# Patient Record
Sex: FEMALE | Race: WHITE | Employment: UNEMPLOYED | URBAN - NONMETROPOLITAN AREA
[De-identification: names, ages, dates, MRNs, and addresses within clinical notes are randomized per-mention and may not be internally consistent; named-entity substitution may affect disease eponyms.]

---

## 2019-11-23 ENCOUNTER — APPOINTMENT (OUTPATIENT)
Dept: CT IMAGING | Age: 49
End: 2019-11-23
Payer: COMMERCIAL

## 2019-11-23 ENCOUNTER — HOSPITAL ENCOUNTER (EMERGENCY)
Age: 49
Discharge: HOME | End: 2019-11-23
Attending: EMERGENCY MEDICINE
Payer: COMMERCIAL

## 2019-11-23 VITALS
WEIGHT: 139 LBS | HEART RATE: 68 BPM | HEIGHT: 64 IN | OXYGEN SATURATION: 99 % | RESPIRATION RATE: 16 BRPM | DIASTOLIC BLOOD PRESSURE: 79 MMHG | SYSTOLIC BLOOD PRESSURE: 119 MMHG | TEMPERATURE: 98.3 F | BODY MASS INDEX: 23.73 KG/M2

## 2019-11-23 DIAGNOSIS — K04.7 DENTAL ABSCESS: Primary | ICD-10-CM

## 2019-11-23 LAB
ANION GAP SERPL CALCULATED.3IONS-SCNC: 15 MMOL/L (ref 4–16)
BASOPHILS ABSOLUTE: 0 K/CU MM
BASOPHILS RELATIVE PERCENT: 0.3 % (ref 0–1)
BUN BLDV-MCNC: 9 MG/DL (ref 6–23)
CALCIUM SERPL-MCNC: 9.8 MG/DL (ref 8.3–10.6)
CHLORIDE BLD-SCNC: 99 MMOL/L (ref 99–110)
CO2: 27 MMOL/L (ref 21–32)
CREAT SERPL-MCNC: 0.7 MG/DL (ref 0.6–1.1)
DIFFERENTIAL TYPE: ABNORMAL
EOSINOPHILS ABSOLUTE: 0.3 K/CU MM
EOSINOPHILS RELATIVE PERCENT: 2.6 % (ref 0–3)
GFR AFRICAN AMERICAN: >60 ML/MIN/1.73M2
GFR NON-AFRICAN AMERICAN: >60 ML/MIN/1.73M2
GLUCOSE BLD-MCNC: 96 MG/DL (ref 70–99)
HCT VFR BLD CALC: 43.4 % (ref 37–47)
HEMOGLOBIN: 13.8 GM/DL (ref 12.5–16)
IMMATURE NEUTROPHIL %: 0.1 % (ref 0–0.43)
LACTATE: 1.3 MMOL/L (ref 0.4–2)
LYMPHOCYTES ABSOLUTE: 1.6 K/CU MM
LYMPHOCYTES RELATIVE PERCENT: 16.3 % (ref 24–44)
MCH RBC QN AUTO: 30.5 PG (ref 27–31)
MCHC RBC AUTO-ENTMCNC: 31.8 % (ref 32–36)
MCV RBC AUTO: 96 FL (ref 78–100)
MONOCYTES ABSOLUTE: 0.7 K/CU MM
MONOCYTES RELATIVE PERCENT: 7.8 % (ref 0–4)
PDW BLD-RTO: 13 % (ref 11.7–14.9)
PLATELET # BLD: 285 K/CU MM (ref 140–440)
PMV BLD AUTO: 10.2 FL (ref 7.5–11.1)
POTASSIUM SERPL-SCNC: 3.9 MMOL/L (ref 3.5–5.1)
RBC # BLD: 4.52 M/CU MM (ref 4.2–5.4)
SEGMENTED NEUTROPHILS ABSOLUTE COUNT: 6.9 K/CU MM
SEGMENTED NEUTROPHILS RELATIVE PERCENT: 72.9 % (ref 36–66)
SODIUM BLD-SCNC: 141 MMOL/L (ref 135–145)
TOTAL IMMATURE NEUTOROPHIL: 0.01 K/CU MM
WBC # BLD: 9.5 K/CU MM (ref 4–10.5)

## 2019-11-23 PROCEDURE — 99283 EMERGENCY DEPT VISIT LOW MDM: CPT

## 2019-11-23 PROCEDURE — 2580000003 HC RX 258: Performed by: EMERGENCY MEDICINE

## 2019-11-23 PROCEDURE — 80048 BASIC METABOLIC PNL TOTAL CA: CPT

## 2019-11-23 PROCEDURE — 70490 CT SOFT TISSUE NECK W/O DYE: CPT

## 2019-11-23 PROCEDURE — 96375 TX/PRO/DX INJ NEW DRUG ADDON: CPT

## 2019-11-23 PROCEDURE — 83605 ASSAY OF LACTIC ACID: CPT

## 2019-11-23 PROCEDURE — 6360000002 HC RX W HCPCS: Performed by: EMERGENCY MEDICINE

## 2019-11-23 PROCEDURE — 96366 THER/PROPH/DIAG IV INF ADDON: CPT

## 2019-11-23 PROCEDURE — 96365 THER/PROPH/DIAG IV INF INIT: CPT

## 2019-11-23 PROCEDURE — 85025 COMPLETE CBC W/AUTO DIFF WBC: CPT

## 2019-11-23 RX ORDER — CITALOPRAM 10 MG/1
10 TABLET ORAL DAILY
COMMUNITY
End: 2021-01-27

## 2019-11-23 RX ORDER — HYDROCODONE BITARTRATE AND ACETAMINOPHEN 5; 325 MG/1; MG/1
1 TABLET ORAL EVERY 8 HOURS PRN
Qty: 6 TABLET | Refills: 0 | Status: SHIPPED | OUTPATIENT
Start: 2019-11-23 | End: 2019-11-25

## 2019-11-23 RX ORDER — AMOXICILLIN AND CLAVULANATE POTASSIUM 250; 62.5 MG/5ML; MG/5ML
875 POWDER, FOR SUSPENSION ORAL 2 TIMES DAILY
Qty: 350 ML | Refills: 0 | Status: SHIPPED | OUTPATIENT
Start: 2019-11-23 | End: 2019-12-03

## 2019-11-23 RX ORDER — KETOROLAC TROMETHAMINE 30 MG/ML
15 INJECTION, SOLUTION INTRAMUSCULAR; INTRAVENOUS ONCE
Status: COMPLETED | OUTPATIENT
Start: 2019-11-23 | End: 2019-11-23

## 2019-11-23 RX ORDER — 0.9 % SODIUM CHLORIDE 0.9 %
1000 INTRAVENOUS SOLUTION INTRAVENOUS ONCE
Status: COMPLETED | OUTPATIENT
Start: 2019-11-23 | End: 2019-11-23

## 2019-11-23 RX ADMIN — SODIUM CHLORIDE 1000 ML: 9 INJECTION, SOLUTION INTRAVENOUS at 09:57

## 2019-11-23 RX ADMIN — SODIUM CHLORIDE 1.5 G: 9 INJECTION, SOLUTION INTRAVENOUS at 09:58

## 2019-11-23 RX ADMIN — KETOROLAC TROMETHAMINE 15 MG: 30 INJECTION, SOLUTION INTRAMUSCULAR at 09:30

## 2019-11-23 ASSESSMENT — PAIN SCALES - GENERAL
PAINLEVEL_OUTOF10: 10
PAINLEVEL_OUTOF10: 10
PAINLEVEL_OUTOF10: 3
PAINLEVEL_OUTOF10: 6

## 2019-11-23 ASSESSMENT — PAIN DESCRIPTION - DESCRIPTORS
DESCRIPTORS: THROBBING
DESCRIPTORS: THROBBING;ITCHING

## 2019-11-23 ASSESSMENT — PAIN DESCRIPTION - ONSET
ONSET: PROGRESSIVE
ONSET: SUDDEN

## 2019-11-23 ASSESSMENT — ENCOUNTER SYMPTOMS
VOMITING: 0
CONSTIPATION: 0
COUGH: 0
DIARRHEA: 0
SINUS PRESSURE: 0
SORE THROAT: 0
RHINORRHEA: 0
NAUSEA: 0
ABDOMINAL PAIN: 0
SHORTNESS OF BREATH: 0
WHEEZING: 0

## 2019-11-23 ASSESSMENT — PAIN - FUNCTIONAL ASSESSMENT
PAIN_FUNCTIONAL_ASSESSMENT: PREVENTS OR INTERFERES SOME ACTIVE ACTIVITIES AND ADLS
PAIN_FUNCTIONAL_ASSESSMENT: PREVENTS OR INTERFERES SOME ACTIVE ACTIVITIES AND ADLS

## 2019-11-23 ASSESSMENT — PAIN DESCRIPTION - PAIN TYPE
TYPE: ACUTE PAIN
TYPE: ACUTE PAIN

## 2019-11-23 ASSESSMENT — PAIN DESCRIPTION - ORIENTATION
ORIENTATION: LOWER;LEFT
ORIENTATION: LOWER;LEFT

## 2019-11-23 ASSESSMENT — PAIN DESCRIPTION - FREQUENCY
FREQUENCY: CONTINUOUS
FREQUENCY: CONTINUOUS

## 2019-11-23 ASSESSMENT — PAIN DESCRIPTION - PROGRESSION
CLINICAL_PROGRESSION: GRADUALLY IMPROVING
CLINICAL_PROGRESSION: GRADUALLY WORSENING

## 2019-11-23 ASSESSMENT — PAIN DESCRIPTION - LOCATION
LOCATION: MOUTH
LOCATION: MOUTH

## 2019-11-25 ENCOUNTER — HOSPITAL ENCOUNTER (EMERGENCY)
Age: 49
Discharge: HOME | End: 2019-11-25
Attending: EMERGENCY MEDICINE
Payer: COMMERCIAL

## 2019-11-25 VITALS
HEART RATE: 90 BPM | WEIGHT: 137 LBS | SYSTOLIC BLOOD PRESSURE: 129 MMHG | BODY MASS INDEX: 23.39 KG/M2 | HEIGHT: 64 IN | TEMPERATURE: 98.5 F | DIASTOLIC BLOOD PRESSURE: 79 MMHG | OXYGEN SATURATION: 100 % | RESPIRATION RATE: 16 BRPM

## 2019-11-25 DIAGNOSIS — K08.89 PAIN, DENTAL: Primary | ICD-10-CM

## 2019-11-25 PROCEDURE — 96372 THER/PROPH/DIAG INJ SC/IM: CPT

## 2019-11-25 PROCEDURE — 6370000000 HC RX 637 (ALT 250 FOR IP): Performed by: EMERGENCY MEDICINE

## 2019-11-25 PROCEDURE — 99283 EMERGENCY DEPT VISIT LOW MDM: CPT

## 2019-11-25 PROCEDURE — 6360000002 HC RX W HCPCS: Performed by: EMERGENCY MEDICINE

## 2019-11-25 RX ORDER — METOCLOPRAMIDE 10 MG/1
10 TABLET ORAL ONCE
Status: COMPLETED | OUTPATIENT
Start: 2019-11-25 | End: 2019-11-25

## 2019-11-25 RX ORDER — KETOROLAC TROMETHAMINE 30 MG/ML
15 INJECTION, SOLUTION INTRAMUSCULAR; INTRAVENOUS ONCE
Status: COMPLETED | OUTPATIENT
Start: 2019-11-25 | End: 2019-11-25

## 2019-11-25 RX ORDER — ACETAMINOPHEN 325 MG/1
650 TABLET ORAL ONCE
Status: DISCONTINUED | OUTPATIENT
Start: 2019-11-25 | End: 2019-11-25 | Stop reason: HOSPADM

## 2019-11-25 RX ORDER — METOCLOPRAMIDE 10 MG/1
10 TABLET ORAL 4 TIMES DAILY
Qty: 15 TABLET | Refills: 0 | Status: SHIPPED | OUTPATIENT
Start: 2019-11-25 | End: 2019-11-25

## 2019-11-25 RX ORDER — ONDANSETRON 4 MG/1
4 TABLET, ORALLY DISINTEGRATING ORAL EVERY 8 HOURS PRN
Qty: 15 TABLET | Refills: 0 | Status: SHIPPED | OUTPATIENT
Start: 2019-11-25 | End: 2019-11-30

## 2019-11-25 RX ADMIN — KETOROLAC TROMETHAMINE 15 MG: 30 INJECTION, SOLUTION INTRAMUSCULAR; INTRAVENOUS at 09:35

## 2019-11-25 RX ADMIN — METOCLOPRAMIDE HYDROCHLORIDE 10 MG: 10 TABLET ORAL at 09:35

## 2019-11-25 ASSESSMENT — PAIN DESCRIPTION - PROGRESSION: CLINICAL_PROGRESSION: NOT CHANGED

## 2019-11-25 ASSESSMENT — PAIN DESCRIPTION - PAIN TYPE: TYPE: ACUTE PAIN

## 2019-11-25 ASSESSMENT — PAIN DESCRIPTION - DESCRIPTORS: DESCRIPTORS: CONSTANT

## 2019-11-25 ASSESSMENT — PAIN SCALES - GENERAL
PAINLEVEL_OUTOF10: 8
PAINLEVEL_OUTOF10: 10
PAINLEVEL_OUTOF10: 10

## 2019-11-25 ASSESSMENT — PAIN DESCRIPTION - LOCATION: LOCATION: HEAD

## 2019-11-25 ASSESSMENT — PAIN DESCRIPTION - FREQUENCY: FREQUENCY: CONTINUOUS

## 2020-10-19 ENCOUNTER — TELEPHONE (OUTPATIENT)
Dept: CARDIOLOGY CLINIC | Age: 50
End: 2020-10-19

## 2020-10-21 ENCOUNTER — TELEPHONE (OUTPATIENT)
Dept: CARDIOLOGY CLINIC | Age: 50
End: 2020-10-21

## 2021-01-27 ENCOUNTER — INITIAL CONSULT (OUTPATIENT)
Dept: CARDIOLOGY CLINIC | Age: 51
End: 2021-01-27
Payer: COMMERCIAL

## 2021-01-27 ENCOUNTER — NURSE ONLY (OUTPATIENT)
Dept: CARDIOLOGY CLINIC | Age: 51
End: 2021-01-27

## 2021-01-27 VITALS
WEIGHT: 154.8 LBS | HEIGHT: 64 IN | SYSTOLIC BLOOD PRESSURE: 110 MMHG | DIASTOLIC BLOOD PRESSURE: 76 MMHG | HEART RATE: 94 BPM | BODY MASS INDEX: 26.43 KG/M2

## 2021-01-27 DIAGNOSIS — R07.89 OTHER CHEST PAIN: ICD-10-CM

## 2021-01-27 DIAGNOSIS — R00.2 HEART PALPITATIONS: Primary | ICD-10-CM

## 2021-01-27 DIAGNOSIS — R00.2 PALPITATIONS: Primary | ICD-10-CM

## 2021-01-27 PROCEDURE — 93000 ELECTROCARDIOGRAM COMPLETE: CPT | Performed by: INTERNAL MEDICINE

## 2021-01-27 PROCEDURE — 99243 OFF/OP CNSLTJ NEW/EST LOW 30: CPT | Performed by: INTERNAL MEDICINE

## 2021-01-27 RX ORDER — AMITRIPTYLINE HYDROCHLORIDE 10 MG/1
TABLET, FILM COATED ORAL NIGHTLY PRN
COMMUNITY
Start: 2021-01-16

## 2021-01-27 RX ORDER — PROMETHAZINE HYDROCHLORIDE 25 MG/1
TABLET ORAL
COMMUNITY

## 2021-01-27 RX ORDER — BUPROPION HYDROCHLORIDE 150 MG/1
150 TABLET ORAL EVERY MORNING
COMMUNITY

## 2021-01-27 NOTE — PROGRESS NOTES
 buPROPion (WELLBUTRIN XL) 150 MG extended release tablet Take 150 mg by mouth every morning      cyclobenzaprine (FLEXERIL) 10 MG tablet Take 10 mg by mouth 3 times daily as needed for Muscle spasms      rizatriptan (MAXALT) 10 MG tablet Take 10 mg by mouth as needed for Migraine May repeat in 2 hours if needed       No current facility-administered medications for this visit. Review of Systems:   · Constitutional: No Fever or Weight Loss   · Eyes: No Decreased Vision  · ENT: No Headaches, Hearing Loss or Vertigo  · Cardiovascular: No chest pain, dyspnea on exertion, palpitations or loss of consciousness  · Respiratory: No cough or wheezing    · Gastrointestinal: No abdominal pain, appetite loss, blood in stools, constipation, diarrhea or heartburn  · Genitourinary: No dysuria, trouble voiding, or hematuria  · Musculoskeletal:  No gait disturbance, weakness or joint complaints  · Integumentary: No rash or pruritis  · Neurological: No TIA or stroke symptoms  · Psychiatric: No anxiety or depression  · Endocrine: No malaise, fatigue or temperature intolerance  · Hematologic/Lymphatic: No bleeding problems, blood clots or swollen lymph nodes  · Allergic/Immunologic: No nasal congestion or hives  All systems negative except as marked. ·   ·      Physical Examination:    Vitals:    01/27/21 1023   BP: 110/76   Pulse: 94    rr 14  Afebrile    Wt Readings from Last 3 Encounters:   01/27/21 154 lb 12.8 oz (70.2 kg)   11/25/19 137 lb (62.1 kg)   11/23/19 139 lb (63 kg)     Body mass index is 26.57 kg/m². General Appearance:  No distress, conversant    Constitutional:  Well developed, Well nourished, No acute distress, Non-toxic appearance.    HENT:  Normocephalic, Atraumatic, Bilateral external ears normal, Oropharynx moist, No oral exudates, Nose normal. Neck- Normal range of motion, No tenderness, Supple, No stridor,no apical-carotid delay, no carotid bruit Eyes:  PERRL, EOMI, Conjunctiva normal, No discharge. Respiratory:  Normal breath sounds, No respiratory distress, No wheezing, No chest tenderness. ,no use of accessory muscles, diaphragm movement is normal  Cardiovascular: (PMI) apex non displaced,no lifts no thrills, no s3,no s4, Normal heart rate, Normal rhythm, No murmurs, No rubs, No gallops. Carotid arteries pulse and amplitude are normal no bruit, no abdominal bruit noted ( normal abdominal aorta ausculation), femoral arteries pulse and amplitude are normal no bruit, pedal pulses are normal  GI:  Bowel sounds normal, Soft, No tenderness, No masses, No pulsatile masses, no hepatosplenomegally, no bruits  : External genitalia appear normal, No masses or lesions. No discharge. No CVA tenderness. Musculoskeletal:  Intact distal pulses, No edema, No tenderness, No cyanosis, No clubbing. Good range of motion in all major joints. No tenderness to palpation or major deformities noted. Back- No tenderness. Integument:  Warm, Dry, No erythema, No rash. Skin: no rash, no ulcers  Lymphatic:  No lymphadenopathy noted. Neurologic:  Alert & oriented x 3, Normal motor function, Normal sensory function, No focal deficits noted. Psychiatric:  Affect normal, Judgment normal, Mood normal.   Lab Review   No results for input(s): WBC, HGB, HCT, PLT in the last 72 hours. No results for input(s): NA, K, CL, CO2, PHOS, BUN, CREATININE in the last 72 hours. Invalid input(s): CA  No results for input(s): AST, ALT, ALB, BILIDIR, BILITOT, ALKPHOS in the last 72 hours. No results for input(s): TROPONINI in the last 72 hours.   No results found for: BNP  No results found for: INR, PROTIME      EKG:nsr    Chest Xray:    ECHO:pending  Labs, echo, meds reviewed Assessment: 53 y. o.year old with PMH of  has a past medical history of Anxiety disorder GENERAL, Chronic migraine, Degenerative cervical disc, Depressive disorder GENERAL, Fibromyalgia muscle pain, Menopausal syndrome, Osteopenia, Osteoporosis, Scoliosis, and Spondylosis. Recommendations:    1. Palpitation: 30 days event monitor ordered, echo and stress test ordered  2. Chest pain:\" stress test and echo ordered  3. Depression and anxiety: on medications as above  4. PTSD\": on medications  5. Back pain: stable  6. Health maintenance: exerise and diet  All labs, medications and tests reviewed, continue all other medications of all above medical condition listed as is.          Dario Luciano MD, 1/27/2021 10:55 AM

## 2021-01-27 NOTE — LETTER
Dr. Luis Fernando Smith  1970  [id number]    Have you had any Chest Pain that is not new? - No  Have you had any Shortness of Breath - No    Have you had any dizziness - No    Have you had any palpitations that are not new?  - Yes  If Yes DO EKG - Do you feel your heart pounding & racing   How long does it last - .sometimes hours and sometimes all day    Do you have any edema - swelling in No      Do you have a surgery or procedure scheduled in the near future - No

## 2021-01-27 NOTE — PATIENT INSTRUCTIONS
30 days e-cardio monitor placed. SN # . Instructed patient on how to record the event and to call monitoring center at [phone] if any problems arise. Instructed patient to disconnect the lead wires from the electrodes before bathing or showering and reattach them afterwards. Instructed patient that the electrodes should be changed every 3 days or if they no longer adhere to the skin. Patient to mail package after the monitor has ended. Patient verbalized understanding.

## 2021-03-17 ENCOUNTER — TELEPHONE (OUTPATIENT)
Dept: CARDIOLOGY CLINIC | Age: 51
End: 2021-03-17

## 2021-03-17 NOTE — TELEPHONE ENCOUNTER
I called pt to inquire if she had stopped wearing her Event Monitor. Pt stated she was told by someone in our office to call [employer] and ask them to reset her monitor, as she was sick and out-of-town and for other reasons was unable to wear the monitor. Pt stated she would call [employer] today and let us know what the outcome is.

## 2021-03-29 ENCOUNTER — TELEPHONE (OUTPATIENT)
Dept: CARDIOLOGY CLINIC | Age: 51
End: 2021-03-29

## 2021-03-29 NOTE — TELEPHONE ENCOUNTER
Patient had called wanting to know if she can redo her Event Monitor. She wore it a couple days and went on vacation and forgot the monitor. I called [employer] and since they provided a report, they will have to send it to a approval committee. [employer] should let us know. I spoke to patient and she voiced understanding.

## 2021-06-29 ENCOUNTER — TELEPHONE (OUTPATIENT)
Dept: CARDIOLOGY CLINIC | Age: 51
End: 2021-06-29

## 2021-06-29 NOTE — TELEPHONE ENCOUNTER
Called [employer] once again to find out if pt's monitor was approved to be restarted. See 3/17/2021 phone encounter. Had to LM on  for [employer] to return call.

## 2021-07-18 ENCOUNTER — HOSPITAL ENCOUNTER (EMERGENCY)
Age: 51
Discharge: HOME | End: 2021-07-18
Attending: EMERGENCY MEDICINE
Payer: COMMERCIAL

## 2021-07-18 VITALS
SYSTOLIC BLOOD PRESSURE: 127 MMHG | OXYGEN SATURATION: 100 % | BODY MASS INDEX: 25.1 KG/M2 | TEMPERATURE: 98.2 F | DIASTOLIC BLOOD PRESSURE: 90 MMHG | RESPIRATION RATE: 16 BRPM | HEART RATE: 91 BPM | WEIGHT: 147 LBS | HEIGHT: 64 IN

## 2021-07-18 DIAGNOSIS — G43.909 NONINTRACTABLE CHRONIC MIGRAINE: Primary | ICD-10-CM

## 2021-07-18 PROCEDURE — 96372 THER/PROPH/DIAG INJ SC/IM: CPT

## 2021-07-18 PROCEDURE — 6360000002 HC RX W HCPCS: Performed by: EMERGENCY MEDICINE

## 2021-07-18 PROCEDURE — 99284 EMERGENCY DEPT VISIT MOD MDM: CPT

## 2021-07-18 RX ORDER — LORAZEPAM 2 MG/ML
2 INJECTION INTRAMUSCULAR ONCE
Status: COMPLETED | OUTPATIENT
Start: 2021-07-18 | End: 2021-07-18

## 2021-07-18 RX ORDER — HALOPERIDOL 5 MG/ML
5 INJECTION INTRAMUSCULAR ONCE
Status: COMPLETED | OUTPATIENT
Start: 2021-07-18 | End: 2021-07-18

## 2021-07-18 RX ADMIN — LORAZEPAM 2 MG: 2 INJECTION INTRAMUSCULAR; INTRAVENOUS at 20:30

## 2021-07-18 RX ADMIN — HALOPERIDOL LACTATE 5 MG: 5 INJECTION, SOLUTION INTRAMUSCULAR at 20:30

## 2021-07-18 ASSESSMENT — PAIN SCALES - GENERAL: PAINLEVEL_OUTOF10: 10

## 2021-07-18 ASSESSMENT — ENCOUNTER SYMPTOMS
NAUSEA: 1
EYES NEGATIVE: 1
RESPIRATORY NEGATIVE: 1

## 2021-07-18 ASSESSMENT — PAIN DESCRIPTION - PAIN TYPE: TYPE: ACUTE PAIN

## 2021-07-18 ASSESSMENT — PAIN DESCRIPTION - LOCATION: LOCATION: HEAD

## 2021-07-19 NOTE — ED NOTES
Pt presents to Ed with c/o migraine. Reports history of same. Takes maxalt at home but she is currently out of med.      Nelly Pardo RN  07/18/21 2019

## 2021-07-19 NOTE — ED NOTES
Discharge instructions reviewed and pt acknowledges understanding. Ambulatory at discharge.        Faustina Christopher RN  07/18/21 2036

## 2021-07-19 NOTE — ED PROVIDER NOTES
The history is provided by the patient. Headache  Pain location:  Occipital  Quality:  Dull  Radiates to:  L neck and R neck  Onset quality:  Gradual  Timing:  Constant  Progression:  Unchanged  Chronicity:  Chronic  Similar to prior headaches: yes    Context comment:  Patient has chronic neck pain and see pain management and this sometimes sets off her migraine. She also has history of migraine  Relieved by:  Nothing  Worsened by:  Sound  Ineffective treatments:  None tried (She normally takes maxalt but she hasnt refilled it and she cant refill it until tomorrow so she came to ER)  Associated symptoms: nausea        Review of Systems   Constitutional: Negative. HENT: Negative. Eyes: Negative. Respiratory: Negative. Cardiovascular: Negative. Gastrointestinal: Positive for nausea. Genitourinary: Negative. Musculoskeletal: Negative. Skin: Negative. Neurological: Positive for headaches. All other systems reviewed and are negative. History reviewed. No pertinent family history. Social History     Socioeconomic History    Marital status:      Spouse name: Not on file    Number of children: Not on file    Years of education: Not on file    Highest education level: Not on file   Occupational History    Not on file   Tobacco Use    Smoking status: Never Smoker    Smokeless tobacco: Never Used   Vaping Use    Vaping Use: Never used   Substance and Sexual Activity    Alcohol use: No    Drug use: No    Sexual activity: Yes     Partners: Male   Other Topics Concern    Not on file   Social History Narrative    Not on file     Social Determinants of Health     Financial Resource Strain:     Difficulty of Paying Living Expenses:    Food Insecurity:     Worried About Running Out of Food in the Last Year:     [address] in the Last Year:    Transportation Needs:     Lack of Transportation (Medical):      Lack of Transportation (Non-Medical):    Physical Activity:     Days of Exercise per Week:     Minutes of Exercise per Session:    Stress:     Feeling of Stress :    Social Connections:     Frequency of Communication with Friends and Family:     Frequency of Social Gatherings with Friends and Family:     Attends Spiritism Services:     Active Member of Clubs or Organizations:     Attends Club or Organization Meetings:     Marital Status:    Intimate Partner Violence:     Fear of Current or Ex-Partner:     Emotionally Abused:     Physically Abused:     Sexually Abused:      Past Surgical History:   Procedure Laterality Date    HYSTERECTOMY       Past Medical History:   Diagnosis Date    Anxiety disorder GENERAL     Chronic migraine     Degenerative cervical disc     Depressive disorder GENERAL     Fibromyalgia muscle pain     Menopausal syndrome     Osteopenia     Osteoporosis     Scoliosis     Spondylosis      Allergies   Allergen Reactions    Contrast [Iodides] Shortness Of Breath    Tetracyclines & Related     Tramadol      Doesn't like the way it makes her feel. Prior to Admission medications    Medication Sig Start Date End Date Taking? Authorizing Provider   amitriptyline (ELAVIL) 10 MG tablet TAKE ONE TABLET BY MOUTH DAILY 1/16/21  Yes Historical Provider, MD   promethazine (PHENERGAN) 25 MG tablet promethazine 25 mg tablet   Take 1 tablet every 4 hours by oral route.    Yes Historical Provider, MD   buPROPion (WELLBUTRIN XL) 150 MG extended release tablet Take 150 mg by mouth every morning   Yes Historical Provider, MD   cyclobenzaprine (FLEXERIL) 10 MG tablet Take 10 mg by mouth 3 times daily as needed for Muscle spasms   Yes Historical Provider, MD   rizatriptan (MAXALT) 10 MG tablet Take 10 mg by mouth as needed for Migraine May repeat in 2 hours if needed   Yes Historical Provider, MD       BP (!) 127/90   Pulse 91   Temp 98.2 °F (36.8 °C) (Oral)   Resp 16   Ht 5' 4\" (1.626 m)   Wt 147 lb (66.7 kg)   SpO2 100%   BMI 25.23 kg/m² Physical Exam  Vitals and nursing note reviewed. Exam conducted with a chaperone present. Constitutional:       Appearance: She is well-developed. HENT:      Head: Normocephalic and atraumatic. Right Ear: External ear normal. No hemotympanum. Tympanic membrane is not erythematous. Left Ear: External ear normal. No hemotympanum. Tympanic membrane is not erythematous. Nose: Nose normal.   Eyes:      Conjunctiva/sclera: Conjunctivae normal.      Pupils: Pupils are equal, round, and reactive to light. Neck:      Trachea: Trachea normal.      Meningeal: Brudzinski's sign and Kernig's sign absent. Cardiovascular:      Rate and Rhythm: Normal rate and regular rhythm. Heart sounds: Normal heart sounds. Pulmonary:      Effort: Pulmonary effort is normal.      Breath sounds: Normal breath sounds. Abdominal:      General: Bowel sounds are normal.      Palpations: Abdomen is soft. Musculoskeletal:         General: Normal range of motion. Cervical back: Normal range of motion and neck supple. No rigidity. No spinous process tenderness or muscular tenderness. Normal range of motion. Skin:     General: Skin is warm and dry. Neurological:      Mental Status: She is alert and oriented to person, place, and time. GCS: GCS eye subscore is 4. GCS verbal subscore is 5. GCS motor subscore is 6. Cranial Nerves: No cranial nerve deficit. Sensory: No sensory deficit. Motor: No tremor, atrophy or abnormal muscle tone. Coordination: Coordination normal.      Gait: Gait normal.      Deep Tendon Reflexes: Reflexes are normal and symmetric. Reflex Scores:       Tricep reflexes are 2+ on the right side and 2+ on the left side. Bicep reflexes are 2+ on the right side and 2+ on the left side. Brachioradialis reflexes are 2+ on the right side and 2+ on the left side. Patellar reflexes are 2+ on the right side and 2+ on the left side.        Achilles reflexes are 2+ on the right side and 2+ on the left side. Psychiatric:         Behavior: Behavior normal.         Thought Content: Thought content normal.         Judgment: Judgment normal.         MDM:    Labs Reviewed - No data to display    No orders to display        HAldol and ativan to help her \"relax\" advised to fill her maxalt in am and use as directed  My typical dicussion, presentation,and considerations for this patients' chief complaint, diagnosis, and differential diagnosis have been considered and discussed. I have stressed need for follow up and reexamination for this encounter. The patient  was informed to discuss with her PCM and her chronic pain doctor about medications in order to avoid ER if possible. Sometimes these medications need to be changed The patient  was also told to return to the emergency department if any changes or any concern. Patient  questions and concerns from this visit have been addressed prior to discharge. Patient was not prescribed medication. The medication(s) use,  medication(s) safety and medication(s) interactions with already prescribed medication(s) have been explained and outlined for this encounter. The patient  was educated that it is their responsibility to verify this information is correct at the time of discharge and to contact this department of any complications with the pharmacy providing this medication(s) or if their any difficulty in obtaining this medication(s). Final Impression    1.  Nonintractable chronic migraine              [address] Emmanuel DO  07/18/21 4449

## 2021-07-23 ENCOUNTER — HOSPITAL ENCOUNTER (EMERGENCY)
Age: 51
Discharge: HOME | End: 2021-07-23
Attending: EMERGENCY MEDICINE
Payer: COMMERCIAL

## 2021-07-23 VITALS
DIASTOLIC BLOOD PRESSURE: 88 MMHG | BODY MASS INDEX: 25.27 KG/M2 | OXYGEN SATURATION: 100 % | SYSTOLIC BLOOD PRESSURE: 145 MMHG | WEIGHT: 148 LBS | RESPIRATION RATE: 16 BRPM | TEMPERATURE: 97.9 F | HEIGHT: 64 IN | HEART RATE: 80 BPM

## 2021-07-23 DIAGNOSIS — M54.2 CHRONIC NECK PAIN: ICD-10-CM

## 2021-07-23 DIAGNOSIS — G89.29 CHRONIC NECK PAIN: ICD-10-CM

## 2021-07-23 DIAGNOSIS — H81.12 BENIGN PAROXYSMAL POSITIONAL VERTIGO OF LEFT EAR: Primary | ICD-10-CM

## 2021-07-23 PROCEDURE — 99285 EMERGENCY DEPT VISIT HI MDM: CPT

## 2021-07-23 PROCEDURE — 6370000000 HC RX 637 (ALT 250 FOR IP): Performed by: EMERGENCY MEDICINE

## 2021-07-23 RX ORDER — HYDROCODONE BITARTRATE AND ACETAMINOPHEN 5; 325 MG/1; MG/1
1 TABLET ORAL EVERY 6 HOURS PRN
COMMUNITY

## 2021-07-23 RX ORDER — MECLIZINE HYDROCHLORIDE 25 MG/1
25 TABLET ORAL ONCE
Status: COMPLETED | OUTPATIENT
Start: 2021-07-23 | End: 2021-07-23

## 2021-07-23 RX ORDER — ACETAMINOPHEN 500 MG
1000 TABLET ORAL ONCE
Status: DISCONTINUED | OUTPATIENT
Start: 2021-07-23 | End: 2021-07-23 | Stop reason: HOSPADM

## 2021-07-23 RX ORDER — MECLIZINE HYDROCHLORIDE 25 MG/1
25 TABLET ORAL 3 TIMES DAILY PRN
Qty: 10 TABLET | Refills: 0 | Status: SHIPPED | OUTPATIENT
Start: 2021-07-23 | End: 2021-08-02

## 2021-07-23 RX ADMIN — MECLIZINE HYDROCHLORIDE 25 MG: 25 TABLET ORAL at 04:45

## 2021-07-23 ASSESSMENT — PAIN DESCRIPTION - PROGRESSION: CLINICAL_PROGRESSION: GRADUALLY WORSENING

## 2021-07-23 ASSESSMENT — PAIN DESCRIPTION - FREQUENCY: FREQUENCY: CONTINUOUS

## 2021-07-23 ASSESSMENT — PAIN DESCRIPTION - LOCATION: LOCATION: BACK;NECK

## 2021-07-23 ASSESSMENT — PAIN SCALES - GENERAL
PAINLEVEL_OUTOF10: 4
PAINLEVEL_OUTOF10: 4

## 2021-07-23 ASSESSMENT — PAIN DESCRIPTION - ONSET: ONSET: SUDDEN

## 2021-07-23 ASSESSMENT — PAIN DESCRIPTION - DESCRIPTORS: DESCRIPTORS: ACHING;STABBING

## 2021-07-23 ASSESSMENT — PAIN DESCRIPTION - PAIN TYPE: TYPE: ACUTE PAIN

## 2021-07-23 ASSESSMENT — PAIN DESCRIPTION - ORIENTATION: ORIENTATION: UPPER

## 2021-07-23 NOTE — ED NOTES
Has had upper back neck pain and got a massage lastnight and the pain was relieved at that time. Awakened at 0200 with dizziness.      Gt Ovalle RN  07/23/21 0691

## 2021-07-23 NOTE — ED PROVIDER NOTES
Emergency Department Encounter    Patient: Simon Shoemaker  MRN: [id number]  : 1970  Date of Evaluation: 2021  ED Provider:  Lion Amor MD    Triage Chief Complaint:   Dizziness    [place]:  Simon Shoemaker is a 49 y.o. female with past medical history of anxiety, fibromyalgia, depression, migraines, chronic neck pain that presents via EMS with intermittent vertigo. Patient with history of benign paroxysmal positional vertigo. Patient reports that she was laying on her left went to roll on her right had acute episode of the room spinning. Patient ports symptoms only lasted a few seconds. Patient also reported sensation of feeling lightheaded like she is in the passout which also only lasted a few seconds. Patient reports symptoms  have resolved however she still feels slightly nauseous. Patient reports chronic neck pain; no recent change. Patient is unaware of any alleviating or aggravating factors. No numbness or tingling. No speech difficulties. No facial droop. No headache. No head trauma. No tinnitus. Does feel some bilateral ear pressure.     ROS - see HPI, below listed is current ROS at time of my eval:  General:  No fevers, no chills, no weakness  Eyes:  No recent vison changes, no discharge  ENT:  No sore throat, no nasal congestion, no hearing changes  Cardiovascular:  No chest pain, no palpitations  Respiratory:  No shortness of breath, no cough, no wheezing  Gastrointestinal:  No pain, no nausea, no vomiting, no diarrhea  Musculoskeletal:  No muscle pain, no joint pain  Skin:  No rash, no pruritis, no easy bruising  Neurologic:  No speech problems, no headache, no extremity numbness, no extremity tingling, no extremity weakness  Psychiatric:  No anxiety  Genitourinary:  No dysuria, no hematuria  Endocrine:  No unexpected weight gain, no unexpected weight loss  Extremities:  no edema, no pain    Past Medical History:   Diagnosis Date    Anxiety disorder GENERAL     Chronic migraine MD         Current Outpatient Medications   Medication Sig Dispense Refill    HYDROcodone-acetaminophen (NORCO) 5-325 MG per tablet Take 1 tablet by mouth every 6 hours as needed for Pain. Only takes 1/2 tablet      meclizine (ANTIVERT) 25 MG tablet Take 1 tablet by mouth 3 times daily as needed for Dizziness or Nausea 10 tablet 0    amitriptyline (ELAVIL) 10 MG tablet TAKE ONE TABLET BY MOUTH DAILY      promethazine (PHENERGAN) 25 MG tablet promethazine 25 mg tablet   Take 1 tablet every 4 hours by oral route.  buPROPion (WELLBUTRIN XL) 150 MG extended release tablet Take 150 mg by mouth every morning      rizatriptan (MAXALT) 10 MG tablet Take 10 mg by mouth as needed for Migraine May repeat in 2 hours if needed      cyclobenzaprine (FLEXERIL) 10 MG tablet Take 10 mg by mouth 3 times daily as needed for Muscle spasms       Allergies   Allergen Reactions    Contrast [Iodides] Shortness Of Breath    Tetracyclines & Related     Tramadol      Doesn't like the way it makes her feel. Nursing Notes Reviewed    Physical Exam:  Triage VS:    ED Triage Vitals   Enc Vitals Group      BP       Pulse       Resp       Temp       Temp src       SpO2       Weight       Height       Head Circumference       Peak Flow       Pain Score       Pain Loc       Pain Edu? Excl. in [address]? My pulse ox interpretation is - normal    General appearance: Patient appears very anxious. No acute distress. Skin:  Warm. Dry. Eye:  Extraocular movements intact. Ears, nose, mouth and throat:  Oral mucosa moist   Neck:  Trachea midline. Extremity:  No swelling. Normal ROM     Heart:  Regular rate and rhythm, normal S1 & S2, no extra heart sounds. Perfusion:  intact  Respiratory:  Lungs clear to auscultation bilaterally. Respirations nonlabored. Abdominal:  Normal bowel sounds. Soft. Nontender. Non distended. Back:  No CVA tenderness to palpation     Neurological:  Alert and oriented times 3.   No focal neuro deficits. Psychiatric: Anxious  Mental Status Exam:   Alert and oriented times three, follows commands, speech and language intact, gait normal    Cranial Mciryr-RO-GJC Intact. Cranial nerve II  Visual acuity: normal  Cranial nerve III  Pupils: equal, round, reactive to light  Cranial nerves III, IV, VI  Extraocular Movements: intact  Cranial nerve V  Facial sensation: intact  Cranial nerve VII  Facial strength: intact  Cranial nerve VIII  Hearing: intact  Cranial nerve IX  Palate: intact  Cranial nerve XI  Shoulder shrug: intact  Cranial nerve XII  Tongue movement: normal    Motor:   Drift: absent  Motor exam is symmetrical 5 out of 5 all extremities bilaterally  Tone: normal  Abnormal Movements: Absent    DTRs- intact bilaterally and symmetrical.  Toes-downgoing bilaterally  Sensory:  Light Touch  Right Upper Extremity: normal  Left Upper Extremity: normal  Right Lower Extremity: normal  Left Lower Extremity: normal    Negative test of skew    NIH 0    I have reviewed and interpreted all of the currently available lab results from this visit (if applicable):  No results found for this visit on 07/23/21. Radiographs (if obtained):  Radiologist's Report Reviewed:  No results found. MDM:  79-year-old female very anxious who presented via EMS  with symptoms of the room spinning around her/vertigo history of benign paroxysmal positional vertigo. Transient episode which lasted a few seconds on waking. Patient became anxious called 911. Benign neurologic exam.  Negative test of skew. NIH 0. History of chronic neck pain declined Deepali-Hallpike in the ED. Patient given dose of Antivert in the ED and will be given a prescription. Recommend close follow-up with her primary care physician; may benefit from vestibular physical therapy and evaluation by ENT in the near future. Patient knowledge understanding and agreement with plan of care.   She was discharged in good condition with stable vitals. Clinical Impression:  1. Benign paroxysmal positional vertigo of left ear    2. Chronic neck pain      Disposition referral (if applicable):  No follow-up provider specified. Disposition medications (if applicable):  New Prescriptions    MECLIZINE (ANTIVERT) 25 MG TABLET    Take 1 tablet by mouth 3 times daily as needed for Dizziness or Nausea     ED Provider Disposition Time  DISPOSITION  07/23/2021 04:53:50 AM    You were evaluated in the emergency department today for dizziness/lightheadedness. You had some symptoms of paroxysmal positional vertigo and a reported history of benign paroxysmal positional vertigo. Please review the handouts given. You may benefit from vestibular physical therapy and an ear nose and throat evaluation. Please follow-up with your primary care physician for close follow-up and further recommendations. You may take meclizine as directed. Return the emergency department for any worsening of your symptoms, headache, constant/continuous symptoms or any medical concerns      Comment: Please note this report has been produced using speech recognition software and may contain errors related to that system including errors in grammar, punctuation, and spelling, as well as words and phrases that may be inappropriate. Efforts were made to edit the dictations.       Marcos Vallejo MD  07/23/21 3662

## 2021-07-29 ENCOUNTER — PROCEDURE VISIT (OUTPATIENT)
Dept: CARDIOLOGY CLINIC | Age: 51
End: 2021-07-29
Payer: COMMERCIAL

## 2021-07-29 DIAGNOSIS — R07.89 OTHER CHEST PAIN: ICD-10-CM

## 2021-07-29 DIAGNOSIS — R00.2 HEART PALPITATIONS: ICD-10-CM

## 2021-07-29 LAB
LV EF: 58 %
LVEF MODALITY: NORMAL

## 2021-07-29 PROCEDURE — 93306 TTE W/DOPPLER COMPLETE: CPT | Performed by: INTERNAL MEDICINE

## 2021-08-04 PROBLEM — R00.2 PALPITATIONS: Status: ACTIVE | Noted: 2021-08-04

## 2021-08-04 NOTE — PROGRESS NOTES
[facility] ([facility] PHYSICAL [facility] [address], [address]  Phone: [phone]    Fax [phone]                  John Tee MD, Spencer Murillo MD, Randee Sommers MD, MD Raffy Roman MD, Jodie Craig MD, Emmanuelle Cleary MD, [address] [place] [address]        Cardiology Progress Note      8/6/2021    RE: Salena Esquivel  (1970)                             Primary cardiologist: Dr. Raffy Bustillo       Subjective:  CC:   1. Palpitations    2. Lipedema        HPI: Salena Esquivel, who is a  49y.o. year old female with a past medical history as listed below. Patient presents to the office for follow up on palpitations. Patient was recently diagnosed with lipedema per [facility]. Patient is  an active female who walks regularly. Patient is  compliant with medications. Patient denies any chest pain, shortness of breath, dizziness, syncope, or palpitations. Past Medical History:   Diagnosis Date    Anxiety disorder GENERAL     Chronic migraine     Degenerative cervical disc     Depressive disorder GENERAL     Fibromyalgia muscle pain     H/O echocardiogram 07/29/2021    Left ventricular function and size is normal, EF is estimated at 55-60%. Mild tricuspid regurgitation; RVSP is 20 mmHg.  Menopausal syndrome     Osteopenia     Osteoporosis     Scoliosis     Spondylosis        Current Outpatient Medications   Medication Sig Dispense Refill    HYDROcodone-acetaminophen (NORCO) 5-325 MG per tablet Take 1 tablet by mouth every 6 hours as needed for Pain. Only takes 1/2 tablet      amitriptyline (ELAVIL) 10 MG tablet TAKE ONE TABLET BY MOUTH DAILY      promethazine (PHENERGAN) 25 MG tablet promethazine 25 mg tablet   Take 1 tablet every 4 hours by oral route.       buPROPion (WELLBUTRIN XL) 150 MG extended release tablet Take 150 mg by mouth every morning      cyclobenzaprine (FLEXERIL) 10 MG tablet Take 10 mg by mouth 3 times daily as needed for Muscle spasms      rizatriptan (MAXALT) 10 MG tablet Take 10 mg by mouth as needed for Migraine May repeat in 2 hours if needed       No current facility-administered medications for this visit. Review of Systems:  Review of Systems   Cardiovascular: Positive for leg swelling. Negative for chest pain and palpitations. Musculoskeletal: Negative. Skin: Negative. Neurological: Negative for dizziness and weakness. All other systems reviewed and are negative. Objective:      Physical Exam:  /84   Pulse 88   Ht 5' 4\" (1.626 m)   Wt 148 lb 9.6 oz (67.4 kg)   BMI 25.51 kg/m²   Wt Readings from Last 3 Encounters:   08/06/21 148 lb 9.6 oz (67.4 kg)   07/23/21 148 lb (67.1 kg)   07/18/21 147 lb (66.7 kg)     Body mass index is 25.51 kg/m². Physical exam:  Physical Exam  Constitutional:       Appearance: She is well-developed. Cardiovascular:      Rate and Rhythm: Normal rate and regular rhythm. Pulses: Intact distal pulses. Dorsalis pedis pulses are 2+ on the right side and 2+ on the left side. Posterior tibial pulses are 2+ on the right side and 2+ on the left side. Heart sounds: Normal heart sounds, S1 normal and S2 normal.   Pulmonary:      Effort: Pulmonary effort is normal.      Breath sounds: Normal breath sounds. Musculoskeletal:         General: Normal range of motion. Right lower leg: Edema present. Left lower leg: Edema present. Skin:     General: Skin is warm and dry. Neurological:      Mental Status: She is alert and oriented to person, place, and time.           DATA:  No results found for: CKTOTAL, CKMB, CKMBINDEX, TROPONINI  BNP:  No results found for: BNP  PT/INR:  No results found for: PTINR  No results found for: LABA1C  No results found for: CHOL, TRIG, HDL, LDLCALC, LDLDIRECT  No results found for: ALT, AST  TSH:  No results found for: TSH    Vitals:    08/06/21 1054   BP: 120/84   Pulse: 88       Echo:7/29/21  Summary   Left ventricular function and size is normal, EF is estimated at 55-60%. Mild left ventricular hypertrophy. E/A reversal; indeterminate diastolic function. No regional wall motion abnormalities were detected. No significant valvular disease noted. Mild tricuspid regurgitation; RVSP is 20 mmHg. No evidence of pericardial effusion. The ASCVD Risk score (Flor Zuleta., et al., 2013) failed to calculate for the following reasons:    Cannot find a previous HDL lab    Cannot find a previous total cholesterol lab      Assessment/ Plan:      Palpitations   -Holter monitor in March 2021 did not show any significant arrhythmias. Echocardiogram within normal limits. Patient has not completed stress test that was previously ordered.    -No lipid panel to address cardiac risk score. Lipedema   -Recently diagnosed, [facility] rheumatologist Dr. Gabriel Mora. Patient will be trying compression to lower extremities. Consulted recommended to lose weight. Which has been difficult. Discussed weight loss medication, patient recently tried Metformin which she could not tolerate due to GI upset. Explained that if patient gets her stress test completed we may be able to start her on Adipex for weight loss. Patient seen, interviewed and examined. Testing was reviewed. Patient is encouraged to exercise even a brisk walk for 30 minutes at least 3 to 4 times a week. Lifestyle and risk factor modificatons discussed. Various goals are discussed and questions answered. Continue current medications. Appropriate prescriptions are addressed. Questions answered and patient verbalizes understanding. Call for any problems, questions, or concerns. Pt is to follow up in 1 months for Cardiac management    Electronically signed by Quentin Ruff.  XUAN Rabago CNP on 8/6/2021 at 11:39 AM

## 2021-08-06 ENCOUNTER — OFFICE VISIT (OUTPATIENT)
Dept: CARDIOLOGY CLINIC | Age: 51
End: 2021-08-06
Payer: COMMERCIAL

## 2021-08-06 VITALS
HEIGHT: 64 IN | HEART RATE: 88 BPM | DIASTOLIC BLOOD PRESSURE: 84 MMHG | BODY MASS INDEX: 25.37 KG/M2 | SYSTOLIC BLOOD PRESSURE: 120 MMHG | WEIGHT: 148.6 LBS

## 2021-08-06 DIAGNOSIS — R00.2 PALPITATIONS: Primary | ICD-10-CM

## 2021-08-06 DIAGNOSIS — R60.9 LIPEDEMA: ICD-10-CM

## 2021-08-06 PROCEDURE — 99214 OFFICE O/P EST MOD 30 MIN: CPT | Performed by: NURSE PRACTITIONER

## 2021-08-06 NOTE — ASSESSMENT & PLAN NOTE
-Recently diagnosed, [facility] rheumatologist Dr. Lebron Rush. Patient will be trying compression to lower extremities. Consulted recommended to lose weight. Which has been difficult. Discussed weight loss medication, patient recently tried Metformin which she could not tolerate due to GI upset. Explained that if patient gets her stress test completed we may be able to start her on Adipex for weight loss.

## 2021-08-24 ENCOUNTER — PROCEDURE VISIT (OUTPATIENT)
Dept: CARDIOLOGY CLINIC | Age: 51
End: 2021-08-24
Payer: COMMERCIAL

## 2021-08-24 DIAGNOSIS — R00.2 HEART PALPITATIONS: ICD-10-CM

## 2021-08-24 DIAGNOSIS — R07.89 OTHER CHEST PAIN: ICD-10-CM

## 2021-08-24 PROCEDURE — 93015 CV STRESS TEST SUPVJ I&R: CPT | Performed by: INTERNAL MEDICINE

## 2021-10-06 ENCOUNTER — OFFICE VISIT (OUTPATIENT)
Dept: CARDIOLOGY CLINIC | Age: 51
End: 2021-10-06
Payer: COMMERCIAL

## 2021-10-06 VITALS
BODY MASS INDEX: 25.81 KG/M2 | HEIGHT: 64 IN | WEIGHT: 151.2 LBS | HEART RATE: 80 BPM | DIASTOLIC BLOOD PRESSURE: 78 MMHG | SYSTOLIC BLOOD PRESSURE: 116 MMHG

## 2021-10-06 DIAGNOSIS — R07.89 OTHER CHEST PAIN: Primary | ICD-10-CM

## 2021-10-06 DIAGNOSIS — E66.3 OVERWEIGHT (BMI 25.0-29.9): ICD-10-CM

## 2021-10-06 PROCEDURE — 99214 OFFICE O/P EST MOD 30 MIN: CPT | Performed by: INTERNAL MEDICINE

## 2021-10-06 RX ORDER — PHENTERMINE HYDROCHLORIDE 37.5 MG/1
37.5 TABLET ORAL
Qty: 30 TABLET | Refills: 0 | Status: SHIPPED | OUTPATIENT
Start: 2021-10-06 | End: 2021-11-05

## 2021-10-06 NOTE — PROGRESS NOTES
Nahun Ibarra MD        OFFICE  FOLLOWUP NOTE    Chief complaints: patient is here for management of palpitations, chest pain, depressions, PTSD, BACKPAIN  Subjective: patient feels better, no chest pain, no shortness of breath, no dizziness, no palpitations    HPI Millie Crane is a 49 y. o.year old who  has a past medical history of Anxiety disorder GENERAL, Chronic migraine, Degenerative cervical disc, Depressive disorder GENERAL, Fibromyalgia muscle pain, H/O echocardiogram, Menopausal syndrome, Osteopenia, Osteoporosis, Scoliosis, and Spondylosis. and presents for management of palpitations, chest pain, depressions, PTSD, BACKPAIN which are well controlled    SHE WANTS  To lose weight on adipex, she used metformin and it made her sick  Current Outpatient Medications   Medication Sig Dispense Refill    HYDROcodone-acetaminophen (NORCO) 5-325 MG per tablet Take 1 tablet by mouth every 6 hours as needed for Pain. Only takes 1/2 tablet      amitriptyline (ELAVIL) 10 MG tablet nightly as needed       promethazine (PHENERGAN) 25 MG tablet promethazine 25 mg tablet   Take 1 tablet every 4 hours by oral route.  buPROPion (WELLBUTRIN XL) 150 MG extended release tablet Take 150 mg by mouth every morning      cyclobenzaprine (FLEXERIL) 10 MG tablet Take 10 mg by mouth 3 times daily as needed for Muscle spasms      rizatriptan (MAXALT) 10 MG tablet Take 10 mg by mouth as needed for Migraine May repeat in 2 hours if needed       No current facility-administered medications for this visit. Allergies: Contrast [iodides], Tetracyclines & related, and Tramadol  Past Medical History:   Diagnosis Date    Anxiety disorder GENERAL     Chronic migraine     Degenerative cervical disc     Depressive disorder GENERAL     Fibromyalgia muscle pain     H/O echocardiogram 07/29/2021    Left ventricular function and size is normal, EF is estimated at 55-60%. Mild tricuspid regurgitation; RVSP is 20 mmHg.     Menopausal syndrome     Osteopenia     Osteoporosis     Scoliosis     Spondylosis      Past Surgical History:   Procedure Laterality Date    HYSTERECTOMY       History reviewed. No pertinent family history. Social History     Tobacco Use    Smoking status: Never Smoker    Smokeless tobacco: Never Used   Substance Use Topics    Alcohol use: No      [unfilled]  Review of Systems:   · Constitutional: No Fever or Weight Loss   · Eyes: No Decreased Vision  · ENT: No Headaches, Hearing Loss or Vertigo  · Cardiovascular: No chest pain, dyspnea on exertion, palpitations or loss of consciousness  · Respiratory: No cough or wheezing    · Gastrointestinal: No abdominal pain, appetite loss, blood in stools, constipation, diarrhea or heartburn  · Genitourinary: No dysuria, trouble voiding, or hematuria  · Musculoskeletal:  No gait disturbance, weakness or joint complaints  · Integumentary: No rash or pruritis  · Neurological: No TIA or stroke symptoms  · Psychiatric: No anxiety or depression  · Endocrine: No malaise, fatigue or temperature intolerance  · Hematologic/Lymphatic: No bleeding problems, blood clots or swollen lymph nodes  · Allergic/Immunologic: No nasal congestion or hives  All systems negative except as marked. Objective:  /78   Pulse 80   Ht 5' 3.5\" (1.613 m)   Wt 151 lb 3.2 oz (68.6 kg)   BMI 26.36 kg/m²   Wt Readings from Last 3 Encounters:   10/06/21 151 lb 3.2 oz (68.6 kg)   08/06/21 148 lb 9.6 oz (67.4 kg)   07/23/21 148 lb (67.1 kg)     Body mass index is 26.36 kg/m². GENERAL - Alert, oriented, pleasant, in no apparent distress,normal grooming  HEENT  pupils are intact, cornea intact, conjunctive normal color, ears are normal in exam,  Neck - Supple. No jugular venous distention noted. No carotid bruits, no apical -carotid delay  Respiratory:  Normal breath sounds, No respiratory distress, No wheezing, No chest tenderness. ,no use of accessory muscles, diaphragm movement is normal  Cardiovascular: (PMI) apex non displaced,no lifts no thrills, no s3,no s4, Normal heart rate, Normal rhythm, No murmurs, No rubs, No gallops. Carotid arteries pulse and amplitude are normal no bruit, no abdominal bruit noted ( normal abdominal aorta ausculation),   Extremities - No cyanosis, clubbing, or significant edema, no varicose veins    Abdomen  No masses, tenderness, or organomegaly, no hepato-splenomegally, no bruits  Musculoskeletal  No significant edema, no kyphosis or scoliosis, no deformity in any extremity noted, muscle strength and tone are normal  Skin: no ulcer,no scar,no stasis dermatitis   Neurologic  alert oriented times 3,Cranial nerves II through XII are grossly intact. There were no gross focal neurologic abnormalities. Psychiatric: normal mood and affect    No results found for: CKTOTAL, CKMB, CKMBINDEX, TROPONINI  BNP:  No results found for: BNP  PT/INR:  No results found for: PTINR  No results found for: LABA1C  No results found for: CHOL, TRIG, HDL, LDLCALC, LDLDIRECT  No results found for: ALT, AST  TSH:  No results found for: TSH    Impression:  Carolyn Saavedra is a 49 y. o.year old who  has a past medical history of Anxiety disorder GENERAL, Chronic migraine, Degenerative cervical disc, Depressive disorder GENERAL, Fibromyalgia muscle pain, H/O echocardiogram, Menopausal syndrome, Osteopenia, Osteoporosis, Scoliosis, and Spondylosis. and presents with     Plan:  1. Overweight: add adipex and hold wellbutrin for one month  2. Palpitation: 30 days event monitor was selina,  echo and stress test were normal  3. Chest pain:\" stress test and echo were normal  4. Depression and anxiety: on medications as above  5. PTSD\": on medications, recommend to hold wellbutrin  6. Back pain: stable  7. Health maintenance: exerise and diet  All labs, medications and tests reviewed, continue all other medications of all above medical condition listed as is.     @[facility]@

## 2021-12-02 ENCOUNTER — HOSPITAL ENCOUNTER (OUTPATIENT)
Dept: PHYSICAL THERAPY | Age: 51
Setting detail: THERAPIES SERIES
Discharge: HOME | End: 2021-12-02
Payer: COMMERCIAL

## 2021-12-02 PROCEDURE — 97162 PT EVAL MOD COMPLEX 30 MIN: CPT

## 2021-12-02 PROCEDURE — 97535 SELF CARE MNGMENT TRAINING: CPT

## 2021-12-02 NOTE — PROGRESS NOTES
PHYSICAL THERAPY LYMPHEDEMA EVAL    Patient Name:  Saige Espinal Date:  2021  :  1970 MRN: [id number]  Referring Physician:  Dr. Julieth Baeza Diagnosis:  [place]    History of edema:  Noticing worsening symptoms in the last 1-2 years, with knots/bumps forming at thighs, and calves. Posterior thigh and leg knots have resolved, but still has tender swollen knots at thighs. LE feel heavy. Does wear some compression hose intermittently with some benefit. Relates some hypermobility and easily bruised. Improved with:  compression  Worsened with:  nothing    Functional Capacity:  Generally independent. Actively walking for fitness. Edema Induced Limitations:  clothing    Assistance available for treatment:  Self   Home/social/DME: has some compression garments recently purchased [employer]. Stay at home mother. Financial resources available for treatment:  Insurance primary    Pain:  No pain, soreness to touch at thighs  Patient Goal(s):  Manage swelling, prevent symptoms from worsening. OBJECTIVE    Other medical conditions/surgeries: Fibromyalgia, migraine, scoliosis, LBP, sciatic. Partial hysterectomy at 20yo. General Observations of Functional Mobility:  Generally independent, ROM is wnls. Description of Edema:  Mild LLE more than R.  Mild at arms. Non pitting    Observations of Skin:  Mild dryness, intact, warm. Scars:   na    Skin breakdown (current or previous):  none    Initial Basic Measures Defer, pt will bring appropriate clothing next time. SEVERITY as % of Impairment, per Lymphedema Life Impact Score  ASSESSMENT         Pt presents with complaints of tender, swollen knots/spots at her thighs. She notes onset/worsening in the last two years with increased spots at posterior thighs and legs. Those have since resolved, but still has points at her thighs. She notes mild swelling at her arms as well.   She does have some swelling L more than R in LE.  She notes history of larger thighs, hips with cellulite and \"lumps/knots\" in her thighs in her late teens mid 19's. RxDX: lipedema    Bariers to Learning:  none  Patient Motivation:  good  Improvement Potential:  good        Factors that impact severity of disorder:      [] Age:       [x] Time since onset:       []   Other:        [] None    When discharged from Part A services:  n/a    Prior therapy for same condition:    Yes      []          No   [x]    Present Health:  Excellent  []    Good  [x]    Fair  []  Poor   []        Prior level of function:  Pt was independent with ADL's without significant pain or difficulties. DME in use: compression garments        PLAN  After eval, patient will be seen for 1 times per week for 5 weeks . Measure for garments and order garments by the end of treatment week number 4. Treatment will include pt ed, ther ex, self care/ADL training, manual therapy: manual lymphatic drainage, compression garment measuring and ordering. GOALS  Due by  1/6/22  1 [place] with HEP.  2 Compliance with elevation daily. 3 Independent with self massage principles. 4 Acquire and wear correctly fitting compression garments for LE's  5 Consistent reduction of edema as evidenced by regular girth measures. TREATMENT TODAY   Pt was instructed/educated on the importance of proper hydration to increase water intake, especially while receiving treatment. Also to monitor salt intake - including prepackaged foods, soft drinks, breads, cereals, soups/broths, lunch/deli meats, snack foods, fast foods, etc.  Explain general anatomy and function of the lymphatic system, effects of treatment, compression, exercise/muscle pump, elevation.   Instruct in central clearing        Time In:  1303   Time Out: 1402  Timed Code Treatment Minutes:  24  Total Treatment Minutes:  59    Electronically signed by:  Colton Samson, PT, PT, 12/2/2021, 1:10 PM        Physical Therapy Certification

## 2021-12-03 NOTE — FLOWSHEET NOTE
Patients Plan of Care was received and signed. Signed POC was scanned and placed in the patients chart.     Luz Perrin

## 2022-02-02 ENCOUNTER — HOSPITAL ENCOUNTER (EMERGENCY)
Age: 52
Discharge: HOME | End: 2022-02-02
Attending: EMERGENCY MEDICINE
Payer: COMMERCIAL

## 2022-02-02 VITALS
SYSTOLIC BLOOD PRESSURE: 118 MMHG | DIASTOLIC BLOOD PRESSURE: 82 MMHG | BODY MASS INDEX: 23.73 KG/M2 | WEIGHT: 139 LBS | HEART RATE: 110 BPM | RESPIRATION RATE: 18 BRPM | OXYGEN SATURATION: 99 % | HEIGHT: 64 IN | TEMPERATURE: 98.5 F

## 2022-02-02 DIAGNOSIS — E86.0 DEHYDRATION: ICD-10-CM

## 2022-02-02 DIAGNOSIS — R05.9 COUGH: Primary | ICD-10-CM

## 2022-02-02 PROCEDURE — 99284 EMERGENCY DEPT VISIT MOD MDM: CPT

## 2022-02-02 PROCEDURE — 96360 HYDRATION IV INFUSION INIT: CPT

## 2022-02-02 PROCEDURE — 2580000003 HC RX 258: Performed by: EMERGENCY MEDICINE

## 2022-02-02 RX ORDER — 0.9 % SODIUM CHLORIDE 0.9 %
1000 INTRAVENOUS SOLUTION INTRAVENOUS ONCE
Status: COMPLETED | OUTPATIENT
Start: 2022-02-02 | End: 2022-02-02

## 2022-02-02 RX ADMIN — SODIUM CHLORIDE 1000 ML: 9 INJECTION, SOLUTION INTRAVENOUS at 13:33

## 2022-02-02 NOTE — ED PROVIDER NOTES
eMERGENCY dEPARTMENT eNCOUnter        [address] [facility]    Chief Complaint   Patient presents with    Cough     c/o prod. cough of clear sputum for 1 week. C/O not eating w/ no appetite       HPI    Francis Tee is a 47 y.o. female who presents with concern for dehydration and cough. States she is been sick for about a week. States she is had a cough which is been productive of clear sputum. States that she feels dehydrated because she does not want to eat or drink. States she has no appetite. Denies fever, vomiting, diarrhea. States that she really just does not want to eat or drink. States that she feels like she needs IV fluids. She denies abdominal pain. REVIEW OF SYSTEMS    Constitutional:  Denies fever, chills  HENT:  See above  Eyes: No vision change, discharge  Cardiovascular:  Denies chest pain, palpitations. Respiratory:  Denies shortness of breath, + cough  GI:  Denies abdominal pain, vomiting, or diarrhea   :  Denies any urinary symptoms  Extremity: Denies edema, joint pain  Neurologic:  Denies headache, focal weakness  Skin: Denies rash, color change       All other review of systems are negative  See HPI and nursing notes for additional information       PAST MEDICAL AND SURGICAL HISTORY    Past Medical History:   Diagnosis Date    Anxiety disorder GENERAL     Chronic migraine     Degenerative cervical disc     Depressive disorder GENERAL     Fibromyalgia muscle pain     H/O echocardiogram 07/29/2021    Left ventricular function and size is normal, EF is estimated at 55-60%. Mild tricuspid regurgitation; RVSP is 20 mmHg.  Menopausal syndrome     Osteopenia     Osteoporosis     Scoliosis     Spondylosis      Past Surgical History:   Procedure Laterality Date    HYSTERECTOMY         CURRENT MEDICATIONS    Current Outpatient Rx   Medication Sig Dispense Refill    HYDROcodone-acetaminophen (NORCO) 5-325 MG per tablet Take 1 tablet by mouth every 6 hours as needed for Pain. Only takes 1/2 tablet      cyclobenzaprine (FLEXERIL) 10 MG tablet Take 10 mg by mouth 3 times daily as needed for Muscle spasms      rizatriptan (MAXALT) 10 MG tablet Take 10 mg by mouth as needed for Migraine May repeat in 2 hours if needed      amitriptyline (ELAVIL) 10 MG tablet nightly as needed       promethazine (PHENERGAN) 25 MG tablet promethazine 25 mg tablet   Take 1 tablet every 4 hours by oral route.  buPROPion (WELLBUTRIN XL) 150 MG extended release tablet Take 150 mg by mouth every morning         ALLERGIES    Allergies   Allergen Reactions    Contrast [Iodides] Shortness Of Breath    Tetracyclines & Related     Tramadol      Doesn't like the way it makes her feel. FAMILY AND SOCIAL HISTORY    History reviewed. No pertinent family history. Social History     Socioeconomic History    Marital status:      Spouse name: None    Number of children: None    Years of education: None    Highest education level: None   Occupational History    None   Tobacco Use    Smoking status: Never Smoker    Smokeless tobacco: Never Used   Vaping Use    Vaping Use: Never used   Substance and Sexual Activity    Alcohol use: No    Drug use: No    Sexual activity: Yes     Partners: Male   Other Topics Concern    None   Social History Narrative    None     Social Determinants of Health     Financial Resource Strain:     Difficulty of Paying Living Expenses: Not on file   Food Insecurity:     Worried About Running Out of Food in the Last Year: Not on file    Lo of Food in the Last Year: Not on file   Transportation Needs:     Lack of Transportation (Medical): Not on file    Lack of Transportation (Non-Medical):  Not on file   Physical Activity:     Days of Exercise per Week: Not on file    Minutes of Exercise per Session: Not on file   Stress:     Feeling of Stress : Not on file   Social Connections:     Frequency of Communication with Friends and Family: Not on file    Frequency of Social Gatherings with Friends and Family: Not on file    Attends Spiritism Services: Not on file    Active Member of Clubs or Organizations: Not on file    Attends Club or Organization Meetings: Not on file    Marital Status: Not on file   Intimate Partner Violence:     Fear of Current or Ex-Partner: Not on file    Emotionally Abused: Not on file    Physically Abused: Not on file    Sexually Abused: Not on file   Housing Stability:     Unable to Pay for Housing in the Last Year: Not on file    Number of [place] in the Last Year: Not on file    Unstable Housing in the Last Year: Not on file       PHYSICAL EXAM    VITAL SIGNS: /82   Pulse 110   Temp 98.5 °F (36.9 °C) (Oral)   Resp 18   Ht 5' 4\" (1.626 m)   Wt 139 lb (63 kg)   SpO2 99%   BMI 23.86 kg/m²   Constitutional:  Awake, alert, no acute distress, non-toxic appearance   Eyes: PERRL, EOM intact. Conjunctiva normal.  HENT:  - Normocephalic, atraumatic     - Nasal passages with mildly erythematous   - Oropharynx mildly erythematous without tonsillar hypertrophy or exudate. No trimus. Handling secretions without difficulty. Neck/Lymphatics:  supple, no lymphadenopathy   Respiratory:  Clear to auscultation bilateral lung fields, no wheezes/rales/rhonchi. Normal respiratory effort. Cardiovascular:  Regular rate, normal rhythm. No murmurs. GI:  Soft, no abdominal tenderness, nondistended. Musculoskeletal:  Normal ROM, no edema   Integument:  Skin pink, warm, dry, no rash      RADIOLOGY/PROCEDURES    No results found. ED COURSE & MEDICAL DECISION MAKING       Vital signs and nursing notes reviewed during ED course. I have independently evaluated this patient . All pertinent Lab data and radiographic results reviewed with patient at bedside. The patient and/or the family were informed of the results of any tests/labs/imaging, the treatment plan, and time was allotted to answer questions.        60-year-old female who presented with concerns for dehydration. She was mildly tachycardic at 110. She is well-appearing, nontoxic. She is not hypoxic. She is 99% on room air. She does not appear to be in any distress. Her abdominal exam is nonsurgical and she denies any abdominal pain. She did come in to get IV fluids because she is concerned about dehydration and her lack of eating and drinking. She is tolerating p.o., thus we discussed pushing p.o. fluids but she does want IV fluids. We will give fluids at this point to help her in her recovery. She did agree to [facility] testing. She knows this 48-hour turnaround time for this. At this point we will plan for discharge home after hydration with instructions to follow-up with a primary care provider outpatient. Clinical  IMPRESSION    Cough, dehydration      Diagnosis and plan discussed in detail with patient who understands and agrees. Patient agrees to return emergency department if symptoms worsen or any new symptoms develop.       (Please note the MDM and HPI sections of this note were completed with a voice recognition program.  Efforts were made to edit the dictations but occasionally words are mis-transcribed.)      Mark Artis DO  02/02/22 0317

## 2022-02-15 ENCOUNTER — HOSPITAL ENCOUNTER (EMERGENCY)
Age: 52
Discharge: HOME | End: 2022-02-15
Attending: EMERGENCY MEDICINE
Payer: COMMERCIAL

## 2022-02-15 ENCOUNTER — APPOINTMENT (OUTPATIENT)
Dept: GENERAL RADIOLOGY | Age: 52
End: 2022-02-15
Payer: COMMERCIAL

## 2022-02-15 VITALS
WEIGHT: 145 LBS | DIASTOLIC BLOOD PRESSURE: 87 MMHG | HEART RATE: 92 BPM | TEMPERATURE: 98.2 F | OXYGEN SATURATION: 100 % | BODY MASS INDEX: 24.75 KG/M2 | RESPIRATION RATE: 16 BRPM | HEIGHT: 64 IN | SYSTOLIC BLOOD PRESSURE: 131 MMHG

## 2022-02-15 DIAGNOSIS — S80.01XA CONTUSION OF RIGHT KNEE, INITIAL ENCOUNTER: Primary | ICD-10-CM

## 2022-02-15 PROCEDURE — 73564 X-RAY EXAM KNEE 4 OR MORE: CPT

## 2022-02-15 PROCEDURE — 99282 EMERGENCY DEPT VISIT SF MDM: CPT

## 2022-02-15 ASSESSMENT — PAIN DESCRIPTION - LOCATION
LOCATION: CHEST;ABDOMEN
LOCATION_2: KNEE

## 2022-02-15 ASSESSMENT — PAIN DESCRIPTION - FREQUENCY: FREQUENCY: CONTINUOUS

## 2022-02-15 ASSESSMENT — PAIN SCALES - GENERAL: PAINLEVEL_OUTOF10: 0

## 2022-02-15 ASSESSMENT — PAIN DESCRIPTION - INTENSITY: RATING_2: 5

## 2022-02-15 ASSESSMENT — ENCOUNTER SYMPTOMS
GASTROINTESTINAL NEGATIVE: 1
RESPIRATORY NEGATIVE: 1

## 2022-02-15 ASSESSMENT — PAIN DESCRIPTION - DURATION: DURATION_2: CONTINUOUS

## 2022-02-15 ASSESSMENT — PAIN DESCRIPTION - PAIN TYPE: TYPE: ACUTE PAIN

## 2022-02-15 ASSESSMENT — PAIN DESCRIPTION - ORIENTATION: ORIENTATION_2: LEFT;RIGHT

## 2022-02-15 NOTE — ED PROVIDER NOTES
 Ran Out of Food in the Last Year: Not on file   Transportation Needs:     Lack of Transportation (Medical): Not on file    Lack of Transportation (Non-Medical): Not on file   Physical Activity:     Days of Exercise per Week: Not on file    Minutes of Exercise per Session: Not on file   Stress:     Feeling of Stress : Not on file   Social Connections:     Frequency of Communication with Friends and Family: Not on file    Frequency of Social Gatherings with Friends and Family: Not on file    Attends [employer] Services: Not on file    Active Member of [address] [place] [address] [employer] or Organizations: Not on file    Attends Club or Organization Meetings: Not on file    Marital Status: Not on file   Intimate Partner Violence:     Fear of Current or Ex-Partner: Not on file    Emotionally Abused: Not on file    Physically Abused: Not on file    Sexually Abused: Not on file   Housing Stability:     Unable to Pay for Housing in the Last Year: Not on file    Number of [place] in the Last Year: Not on file    Unstable Housing in the Last Year: Not on file     No current facility-administered medications for this encounter. Current Outpatient Medications   Medication Sig Dispense Refill    HYDROcodone-acetaminophen (NORCO) 5-325 MG per tablet Take 1 tablet by mouth every 6 hours as needed for Pain. Only takes 1/2 tablet      amitriptyline (ELAVIL) 10 MG tablet nightly as needed       promethazine (PHENERGAN) 25 MG tablet promethazine 25 mg tablet   Take 1 tablet every 4 hours by oral route.       buPROPion (WELLBUTRIN XL) 150 MG extended release tablet Take 150 mg by mouth every morning      cyclobenzaprine (FLEXERIL) 10 MG tablet Take 10 mg by mouth 3 times daily as needed for Muscle spasms      rizatriptan (MAXALT) 10 MG tablet Take 10 mg by mouth as needed for Migraine May repeat in 2 hours if needed       Allergies   Allergen Reactions    Contrast [Iodides] Shortness Of Breath    Tetracyclines & Related     Tramadol      Doesn't like the way it makes her feel. ROS:    Review of Systems   Constitutional: Negative. Respiratory: Negative. Gastrointestinal: Negative. Musculoskeletal: Positive for joint swelling. Negative for gait problem. Neurological: Negative. All other systems reviewed and are negative. Nursing Notes Reviewed    Physical Exam:      ED Triage Vitals   Enc Vitals Group      BP       Pulse       Resp       Temp       Temp src       SpO2       Weight       Height       Head Circumference       Peak Flow       Pain Score       Pain Loc       Pain Edu? Excl. in [address]? Physical Exam  Vitals and nursing note reviewed. Exam conducted with a chaperone present. Constitutional:       Appearance: She is well-developed. HENT:      Head: Normocephalic and atraumatic. Right Ear: External ear normal.      Left Ear: External ear normal.   Eyes:      General: No scleral icterus. Right eye: No discharge. Left eye: No discharge. Conjunctiva/sclera: Conjunctivae normal.      Pupils: Pupils are equal, round, and reactive to light. Neck:      Thyroid: No thyromegaly. Vascular: No JVD. Trachea: No tracheal deviation. Cardiovascular:      Rate and Rhythm: Normal rate and regular rhythm. Heart sounds: Normal heart sounds. No murmur heard. No friction rub. No gallop. Pulmonary:      Effort: Pulmonary effort is normal. No respiratory distress. Breath sounds: Normal breath sounds. No stridor. No wheezing or rales. Chest:      Chest wall: No tenderness. Abdominal:      General: Bowel sounds are normal. There is no distension. Palpations: Abdomen is soft. There is no mass. Tenderness: There is no abdominal tenderness. There is no guarding or rebound. Hernia: No hernia is present. Musculoskeletal:         General: No deformity. Cervical back: Normal, normal range of motion and neck supple.       Thoracic back: Normal. Right knee: Ecchymosis and bony tenderness present. Decreased range of motion. Tenderness present over the medial joint line. No patellar tendon tenderness. Instability Tests: Anterior drawer test negative. Posterior drawer test negative. Anterior Lachman test negative. Left knee: Ecchymosis present. No bony tenderness. Normal range of motion. No patellar tendon tenderness. Instability Tests: Anterior drawer test negative. Posterior drawer test negative. Right lower leg: Normal.      Left lower leg: Normal.   Lymphadenopathy:      Cervical: No cervical adenopathy. Skin:     General: Skin is warm and dry. Coloration: Skin is not pale. Findings: No erythema or rash. Neurological:      Mental Status: She is alert and oriented to person, place, and time. Cranial Nerves: No cranial nerve deficit. Sensory: No sensory deficit. Deep Tendon Reflexes: Reflexes are normal and symmetric. Reflexes normal.   Psychiatric:         Speech: Speech normal.         Behavior: Behavior normal.         Thought Content: Thought content normal.         Judgment: Judgment normal.         I have reviewed and interpreted all of the currently available lab results from this visit (ifapplicable):  No results found for this visit on 02/15/22. Radiographs (if obtained):  [] The following radiograph wasinterpreted by myself in the absence of a radiologist:   [] Radiologist's Report Reviewed:  XR KNEE RIGHT (MIN 4 VIEWS)    (Results Pending)         EKG (if obtained): (All EKG's are interpreted by myself in the absence of a cardiologist)    Chart review shows recent radiographs:  No results found.     MDM:    Patient fell after walking her dog she has contusion of bilateral hips worse on the right the left x-rays only obtained on the right knees and she is tender at the tibial tuberosity x-rays of my review is benign treatment supportive if she understands the potential for occult injury follow-up as needed    Please note that portions of this note may have been completed with a voice recognition/dictation program. Efforts were made to edit the dictations but occasionally words are mis-transcribed.      All pertinent Lab data and radiographic results reviewed with patient at bedside. The patient and/or the family were informed of the results of any tests/labs/imaging, the treatment plan, and time was allotted to answer questions. See chart for details of medications given during the ED stay.     The likelihood of other entities in the differential is insufficient to justify any further testing for them. This was explained to the patient. The patient was advised that persistent or worsening symptoms would require further evaluation.         Clinical Impression:  1. Contusion of right knee, initial encounter      Disposition referral (if applicable):  Jimmy Bolton MD  [address] [address] [phone]    Schedule an appointment as soon as possible for a visit   If symptoms worsen    Disposition medications (if applicable):  New Prescriptions    No medications on file           Luke Armenta DO, FACEP      Comment: Please note this report has been produced using speech recognition software and maycontain errors related to that system including errors in grammar, punctuation, and spelling, as well as words and phrases that may be inappropriate. If there are any questions or concerns please feel free to contact thedictating provider for clarification.         Charma Hamman, DO  02/15/22 [id number]

## 2022-12-14 DIAGNOSIS — G43.019 MIGRAINE WITHOUT AURA, INTRACTABLE, WITHOUT STATUS MIGRAINOSUS: ICD-10-CM

## 2022-12-14 RX ORDER — AMITRIPTYLINE HYDROCHLORIDE 10 MG/1
TABLET, FILM COATED ORAL
Qty: 90 TABLET | Refills: 1 | OUTPATIENT
Start: 2022-12-14

## 2024-05-15 ENCOUNTER — OFFICE VISIT (OUTPATIENT)
Dept: CARDIOLOGY CLINIC | Age: 54
End: 2024-05-15
Payer: COMMERCIAL

## 2024-05-15 VITALS
HEART RATE: 96 BPM | HEIGHT: 64 IN | DIASTOLIC BLOOD PRESSURE: 74 MMHG | BODY MASS INDEX: 25.03 KG/M2 | WEIGHT: 146.6 LBS | SYSTOLIC BLOOD PRESSURE: 122 MMHG

## 2024-05-15 DIAGNOSIS — M79.89 LEG SWELLING: ICD-10-CM

## 2024-05-15 DIAGNOSIS — R00.2 PALPITATIONS: Primary | ICD-10-CM

## 2024-05-15 DIAGNOSIS — F43.10 PTSD (POST-TRAUMATIC STRESS DISORDER): ICD-10-CM

## 2024-05-15 PROCEDURE — 93000 ELECTROCARDIOGRAM COMPLETE: CPT | Performed by: INTERNAL MEDICINE

## 2024-05-15 PROCEDURE — 99214 OFFICE O/P EST MOD 30 MIN: CPT | Performed by: INTERNAL MEDICINE

## 2024-05-15 NOTE — PROGRESS NOTES
Mara Cristobal MD        OFFICE  FOLLOWUP NOTE    Chief complaints: patient is here for management of  palpitations, chest pain, depressions, PTSD, BACKPAIN     Subjective: patient feels leg swelling, no chest pain, no shortness of breath, no dizziness, no palpitations    HPI Chela is a 54 y.o.year old who  has a past medical history of Anxiety disorder GENERAL, Chronic migraine, Degenerative cervical disc, Depressive disorder GENERAL, Fibromyalgia muscle pain, H/O echocardiogram, Menopausal syndrome, Osteopenia, Osteoporosis, Scoliosis, and Spondylosis. and presents for management of  palpitations, chest pain, depressions, PTSD, BACKPAIN  which are well controlled      Current Outpatient Medications   Medication Sig Dispense Refill    HYDROcodone-acetaminophen (NORCO) 5-325 MG per tablet Take 1 tablet by mouth every 6 hours as needed for Pain. Only takes 1/2 tablet   AS needed only      amitriptyline (ELAVIL) 10 MG tablet nightly as needed PRN      promethazine (PHENERGAN) 25 MG tablet Only when needed      buPROPion (WELLBUTRIN XL) 150 MG extended release tablet Take 1 tablet by mouth every morning      cyclobenzaprine (FLEXERIL) 10 MG tablet Take 1 tablet by mouth 3 times daily as needed for Muscle spasms      rizatriptan (MAXALT) 10 MG tablet Take 1 tablet by mouth as needed for Migraine May repeat in 2 hours if needed       No current facility-administered medications for this visit.     Allergies: Contrast [iodides], Tetracyclines & related, and Tramadol  Past Medical History:   Diagnosis Date    Anxiety disorder GENERAL     Chronic migraine     Degenerative cervical disc     Depressive disorder GENERAL     Fibromyalgia muscle pain     H/O echocardiogram 07/29/2021    Left ventricular function and size is normal, EF is estimated at 55-60%.   Mild tricuspid regurgitation; RVSP is 20 mmHg.    Menopausal syndrome     Osteopenia     Osteoporosis     Scoliosis     Spondylosis      Past Surgical

## 2024-05-21 ENCOUNTER — TELEPHONE (OUTPATIENT)
Dept: CARDIOLOGY CLINIC | Age: 54
End: 2024-05-21

## 2024-05-21 NOTE — TELEPHONE ENCOUNTER
Patient height was updated in chart so that has updated her BMI, wants to know if  Will start her on adipex? Next appt isnt until aug.

## 2024-05-22 ENCOUNTER — TELEPHONE (OUTPATIENT)
Dept: CARDIOLOGY CLINIC | Age: 54
End: 2024-05-22

## 2024-05-22 NOTE — TELEPHONE ENCOUNTER
Called to patient the results of recent testing doppler - No evidence of deep vein and superficial thrombosis in the bilateral lower extremity. No significant venous insufficiency in the bilateral lower extremities.  Patient verbalized understanding of all information given.

## 2024-10-25 ENCOUNTER — HOSPITAL ENCOUNTER (EMERGENCY)
Age: 54
Discharge: HOME | End: 2024-10-25
Attending: EMERGENCY MEDICINE
Payer: COMMERCIAL

## 2024-10-25 VITALS
HEART RATE: 103 BPM | SYSTOLIC BLOOD PRESSURE: 138 MMHG | OXYGEN SATURATION: 100 % | RESPIRATION RATE: 18 BRPM | TEMPERATURE: 97.5 F | BODY MASS INDEX: 25.95 KG/M2 | WEIGHT: 141 LBS | DIASTOLIC BLOOD PRESSURE: 86 MMHG | HEIGHT: 62 IN

## 2024-10-25 DIAGNOSIS — T22.212A PARTIAL THICKNESS BURN OF LEFT FOREARM, INITIAL ENCOUNTER: Primary | ICD-10-CM

## 2024-10-25 PROCEDURE — 99283 EMERGENCY DEPT VISIT LOW MDM: CPT

## 2024-10-25 RX ORDER — CEPHALEXIN 500 MG/1
500 CAPSULE ORAL 4 TIMES DAILY
Qty: 40 CAPSULE | Refills: 0 | Status: SHIPPED | OUTPATIENT
Start: 2024-10-25

## 2024-10-25 RX ORDER — SILVER SULFADIAZINE 10 MG/G
CREAM TOPICAL
COMMUNITY
Start: 2024-10-25

## 2024-10-25 RX ORDER — IBUPROFEN 800 MG/1
800 TABLET, FILM COATED ORAL EVERY 6 HOURS PRN
Qty: 30 TABLET | Refills: 0 | Status: SHIPPED | OUTPATIENT
Start: 2024-10-25

## 2024-10-25 RX ORDER — CEPHALEXIN 250 MG/5ML
250 POWDER, FOR SUSPENSION ORAL
COMMUNITY
Start: 2024-10-25

## 2024-10-26 NOTE — DISCHARGE INSTRUCTIONS
Use Silvadene cream to the area twice daily.  Keep it covered with a Band-Aid.  Return if red streaking extends up to your elbow.  Continue using cephalexin, 500 mg 4 times a day for the next 10 days.  Return to the ER for any problems or concerns or worsening symptoms particularly purulent drainage and increased swelling from the area
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
## 2025-03-18 ENCOUNTER — HOSPITAL ENCOUNTER (EMERGENCY)
Age: 55
Discharge: HOME | End: 2025-03-18
Attending: EMERGENCY MEDICINE
Payer: COMMERCIAL

## 2025-03-18 VITALS
DIASTOLIC BLOOD PRESSURE: 90 MMHG | SYSTOLIC BLOOD PRESSURE: 131 MMHG | OXYGEN SATURATION: 100 % | HEIGHT: 62 IN | BODY MASS INDEX: 27.23 KG/M2 | RESPIRATION RATE: 18 BRPM | WEIGHT: 148 LBS | TEMPERATURE: 97.7 F | HEART RATE: 90 BPM

## 2025-03-18 DIAGNOSIS — M54.9 DORSALGIA: Primary | ICD-10-CM

## 2025-03-18 PROCEDURE — 6360000002 HC RX W HCPCS: Performed by: EMERGENCY MEDICINE

## 2025-03-18 PROCEDURE — 99284 EMERGENCY DEPT VISIT MOD MDM: CPT

## 2025-03-18 PROCEDURE — 96372 THER/PROPH/DIAG INJ SC/IM: CPT

## 2025-03-18 PROCEDURE — 6370000000 HC RX 637 (ALT 250 FOR IP): Performed by: EMERGENCY MEDICINE

## 2025-03-18 RX ORDER — PREDNISONE 50 MG/1
50 TABLET ORAL DAILY
Qty: 5 TABLET | Refills: 0 | Status: SHIPPED | OUTPATIENT
Start: 2025-03-18 | End: 2025-03-23

## 2025-03-18 RX ORDER — LIDOCAINE 50 MG/G
1 PATCH TOPICAL DAILY
Qty: 10 PATCH | Refills: 0 | Status: SHIPPED | OUTPATIENT
Start: 2025-03-18 | End: 2025-03-28

## 2025-03-18 RX ORDER — METHOCARBAMOL 500 MG/1
500 TABLET, FILM COATED ORAL ONCE
Status: COMPLETED | OUTPATIENT
Start: 2025-03-18 | End: 2025-03-18

## 2025-03-18 RX ORDER — KETOROLAC TROMETHAMINE 30 MG/ML
30 INJECTION, SOLUTION INTRAMUSCULAR; INTRAVENOUS ONCE
Status: COMPLETED | OUTPATIENT
Start: 2025-03-18 | End: 2025-03-18

## 2025-03-18 RX ORDER — LIDOCAINE 4 G/G
1 PATCH TOPICAL ONCE
Status: DISCONTINUED | OUTPATIENT
Start: 2025-03-18 | End: 2025-03-18 | Stop reason: HOSPADM

## 2025-03-18 RX ORDER — ONDANSETRON 4 MG/1
4 TABLET, ORALLY DISINTEGRATING ORAL ONCE
Status: COMPLETED | OUTPATIENT
Start: 2025-03-18 | End: 2025-03-18

## 2025-03-18 RX ORDER — PREDNISONE 20 MG/1
40 TABLET ORAL ONCE
Status: DISCONTINUED | OUTPATIENT
Start: 2025-03-18 | End: 2025-03-18 | Stop reason: HOSPADM

## 2025-03-18 RX ORDER — METHOCARBAMOL 500 MG/1
500 TABLET, FILM COATED ORAL 4 TIMES DAILY PRN
Qty: 20 TABLET | Refills: 0 | Status: SHIPPED | OUTPATIENT
Start: 2025-03-18 | End: 2025-03-23

## 2025-03-18 RX ADMIN — ONDANSETRON 4 MG: 4 TABLET, ORALLY DISINTEGRATING ORAL at 20:18

## 2025-03-18 RX ADMIN — KETOROLAC TROMETHAMINE 30 MG: 30 INJECTION, SOLUTION INTRAMUSCULAR at 20:13

## 2025-03-18 RX ADMIN — METHOCARBAMOL 500 MG: 500 TABLET ORAL at 20:47

## 2025-03-18 ASSESSMENT — PAIN DESCRIPTION - LOCATION: LOCATION: BACK

## 2025-03-18 ASSESSMENT — ENCOUNTER SYMPTOMS: BACK PAIN: 1

## 2025-03-18 ASSESSMENT — PAIN - FUNCTIONAL ASSESSMENT: PAIN_FUNCTIONAL_ASSESSMENT: 0-10

## 2025-03-18 ASSESSMENT — PAIN DESCRIPTION - DESCRIPTORS: DESCRIPTORS: ACHING;SHARP

## 2025-03-18 ASSESSMENT — PAIN SCALES - GENERAL
PAINLEVEL_OUTOF10: 10
PAINLEVEL_OUTOF10: 6

## 2025-03-18 NOTE — ED PROVIDER NOTES
daily for 10 days 12 hours on, 12 hours off., Disp-10 patch, R-0Normal      methocarbamol (ROBAXIN) 500 MG tablet Take 1 tablet by mouth 4 times daily as needed (for pain/spams), Disp-20 tablet, R-0Normal      predniSONE (DELTASONE) 50 MG tablet Take 1 tablet by mouth daily for 5 days, Disp-5 tablet, R-0Normal             ED Provider Disposition Time  DISPOSITION Decision To Discharge 03/18/2025 08:40:56 PM   DISPOSITION CONDITION Stable           Appropriate personal protective equipment was worn during the patient's evaluation.  These included surgical, eye protection, surgical mask or in 95 respirator and gloves.  The patient was also placed in a surgical mask for the prevention of possible spread of respiratory viral illnesses.    The Patient was instructed to read the package inserts with any medication that was prescribed.  Major potential reactions and medication interactions were discussed.  The Patient understands that there are numerous possible adverse reactions not covered.    The patient was also instructed to arrange follow-up with his or her primary care provider for review of any pending labwork or incidental findings on any radiology results that were obtained.  All efforts were made to discuss any incidental findings that require further monitoring.      Controlled Substances Monitoring:          No data to display                (Please note that portions of this note were completed with a voice recognition program.  Efforts were made to edit the dictations but occasionally words are mis-transcribed.)    Eloina Ervin MD (electronically signed)  Attending Emergency Physician           Eloina Anaya MD  03/18/25 [id number]

## 2025-03-19 NOTE — DISCHARGE INSTRUCTIONS
You may find that heat and or ice anti-inflammatory medications like ibuprofen, naproxen and Tylenol will help with your symptoms.    If you develop any worsening or concerning symptoms, please seek immediate medical evaluation peer

## 2025-05-07 ENCOUNTER — OFFICE VISIT (OUTPATIENT)
Dept: CARDIOLOGY CLINIC | Age: 55
End: 2025-05-07
Payer: COMMERCIAL

## 2025-05-07 VITALS
HEART RATE: 89 BPM | SYSTOLIC BLOOD PRESSURE: 120 MMHG | BODY MASS INDEX: 26.75 KG/M2 | WEIGHT: 151 LBS | HEIGHT: 63 IN | DIASTOLIC BLOOD PRESSURE: 74 MMHG

## 2025-05-07 DIAGNOSIS — R00.2 PALPITATIONS: Primary | ICD-10-CM

## 2025-05-07 DIAGNOSIS — R06.02 SHORTNESS OF BREATH: ICD-10-CM

## 2025-05-07 PROCEDURE — 99214 OFFICE O/P EST MOD 30 MIN: CPT | Performed by: INTERNAL MEDICINE

## 2025-05-07 PROCEDURE — 93000 ELECTROCARDIOGRAM COMPLETE: CPT | Performed by: INTERNAL MEDICINE

## 2025-05-07 RX ORDER — BUSPIRONE HYDROCHLORIDE 10 MG/1
10 TABLET ORAL 2 TIMES DAILY PRN
COMMUNITY

## 2025-05-07 RX ORDER — OXYCODONE AND ACETAMINOPHEN 5; 325 MG/1; MG/1
0.5 TABLET ORAL DAILY PRN
COMMUNITY
Start: 2025-04-22

## 2025-05-07 RX ORDER — OMEPRAZOLE 20 MG/1
CAPSULE, DELAYED RELEASE ORAL
COMMUNITY

## 2025-05-07 RX ORDER — ONDANSETRON 8 MG/1
8 TABLET, ORALLY DISINTEGRATING ORAL EVERY 8 HOURS PRN
COMMUNITY
Start: 2024-12-20

## 2025-05-07 RX ORDER — CYCLOBENZAPRINE HCL 10 MG
10 TABLET ORAL 3 TIMES DAILY PRN
COMMUNITY

## 2025-05-07 NOTE — PATIENT INSTRUCTIONS
Thank you for allowing us to care for you today!   We want to ensure we can follow your treatment plan and we strive to give you the best outcomes and experience possible.   If you ever have a life threatening emergency and call 911 - for an ambulance (EMS)  REMEMBER  Our providers can only care for you at:   [facility] or [facility]   Even if you have someone take you or you drive yourself we can only care for you in a [facility] facility. Our providers are not setup at the other healthcare locations!    PLEASE CALL OUR OFFICE DURING NORMAL BUSINESS HOURS  Monday through Friday 8 am to 5 pm  AFTER HOURS the physician on-call cannot help with scheduling, rescheduling, procedure instruction questions or any type of medication need or issue.  [facility] P:[phone] - [facility] P:[phone] - [facility] P:[phone]      If you receive a survey:  We would appreciate you taking the time to share your experience concerning your provider visit in the office.    These surveys are confidential!  We are eager to improve and are counting on you to share your feedback so we can ensure you get the best care possible.

## 2025-05-07 NOTE — PROGRESS NOTES
Mara Cristobal MD        OFFICE  FOLLOWUP NOTE    Chief complaints: patient is here for management of  palpitations, chest pain, depressions, PTSD, BACKPAIN     Subjective: patient feels anxiety, no chest pain, some times feel stop breathing and lose breath  no dizziness, no palpitations    HPI Chela is a 55 y.o.year old who  has a past medical history of Anxiety disorder GENERAL, Chronic migraine, Degenerative cervical disc, Depressive disorder GENERAL, Fibromyalgia muscle pain, H/O echocardiogram, Menopausal syndrome, Osteopenia, Osteoporosis, Scoliosis, Spondylosis, and Vascular US Duplex Lower Extremity Venous Bilateral. and presents for management of  palpitations, chest pain, depressions, PTSD, BACKPAIN , which are well controlled      Current Outpatient Medications   Medication Sig Dispense Refill    oxyCODONE-acetaminophen (PERCOCET) 5-325 MG per tablet Take 0.5 tablets by mouth daily as needed.      busPIRone (BUSPAR) 10 MG tablet Take 1 tablet by mouth 2 times daily as needed      cyclobenzaprine (FLEXERIL) 10 MG tablet Take 1 tablet by mouth 3 times daily as needed      omeprazole (PRILOSEC) 20 MG delayed release capsule TAKE 1 CAPSULE BY MOUTH DAILY 30 MINUTES TO 1 HOUR BEFORE A MEAL      ondansetron (ZOFRAN-ODT) 8 MG TBDP disintegrating tablet Take 1 tablet by mouth every 8 hours as needed      silver sulfADIAZINE (SILVADENE) 1 % cream Apply topically      ibuprofen (ADVIL;MOTRIN) 800 MG tablet Take 1 tablet by mouth every 6 hours as needed for Pain 30 tablet 0    HYDROcodone-acetaminophen (NORCO) 5-325 MG per tablet Take 1 tablet by mouth every 6 hours as needed for Pain. Only takes 1/2 tablet   AS needed only      rizatriptan (MAXALT) 10 MG tablet Take 1 tablet by mouth as needed for Migraine May repeat in 2 hours if needed       No current facility-administered medications for this visit.     Allergies: Contrast [iodides], Tetracyclines & related, and Tramadol  Past Medical History:

## 2025-05-15 ENCOUNTER — TELEPHONE (OUTPATIENT)
Dept: CARDIOLOGY CLINIC | Age: 55
End: 2025-05-15

## 2025-05-15 NOTE — TELEPHONE ENCOUNTER
Patient called she is having a bad reaction to appliance adhesive on her 7 day monitor. She has not worn if 3 days ago and it is to be sent back today. Patient to leave monitor off and send back.    Patient verbalized understanding of all information given.

## 2025-07-09 ENCOUNTER — TELEPHONE (OUTPATIENT)
Dept: CARDIOLOGY CLINIC | Age: 55
End: 2025-07-09

## 2025-07-30 ENCOUNTER — TELEPHONE (OUTPATIENT)
Dept: CARDIOLOGY CLINIC | Age: 55
End: 2025-07-30

## 2025-07-30 NOTE — TELEPHONE ENCOUNTER
This is cardiac monitor report, basic rhtyhm is sinus, min hr is 67 bpm max hr is138 bpm, no afib, no heart block, no VTACH , few pvcs and APCs noted, brief SVT noted